# Patient Record
Sex: FEMALE | Race: BLACK OR AFRICAN AMERICAN | NOT HISPANIC OR LATINO | Employment: OTHER | URBAN - METROPOLITAN AREA
[De-identification: names, ages, dates, MRNs, and addresses within clinical notes are randomized per-mention and may not be internally consistent; named-entity substitution may affect disease eponyms.]

---

## 2023-05-13 DIAGNOSIS — E78.5 HYPERLIPIDEMIA, UNSPECIFIED HYPERLIPIDEMIA TYPE: ICD-10-CM

## 2023-05-15 PROBLEM — R09.89 ABDOMINAL BRUIT: Status: RESOLVED | Noted: 2023-05-15 | Resolved: 2023-05-15

## 2023-05-15 PROBLEM — E66.9 OBESITY: Status: ACTIVE | Noted: 2023-05-15

## 2023-05-15 PROBLEM — M47.816 LUMBAR SPONDYLOSIS: Status: ACTIVE | Noted: 2023-05-15

## 2023-05-15 PROBLEM — M25.561 ACUTE PAIN OF RIGHT KNEE: Status: RESOLVED | Noted: 2023-05-15 | Resolved: 2023-05-15

## 2023-05-15 PROBLEM — S39.012A LUMBAR STRAIN: Status: ACTIVE | Noted: 2023-05-15

## 2023-05-15 PROBLEM — R07.89 ATYPICAL CHEST PAIN: Status: ACTIVE | Noted: 2023-05-15

## 2023-05-15 PROBLEM — M54.16 LUMBAR RADICULOPATHY: Status: ACTIVE | Noted: 2023-05-15

## 2023-05-15 PROBLEM — M85.80 OSTEOPENIA: Status: ACTIVE | Noted: 2023-05-15

## 2023-05-15 PROBLEM — E11.9 DIABETES MELLITUS (MULTI): Status: ACTIVE | Noted: 2023-05-15

## 2023-05-15 PROBLEM — M79.18 BUTTOCK PAIN: Status: RESOLVED | Noted: 2023-05-15 | Resolved: 2023-05-15

## 2023-05-15 PROBLEM — R09.89 BILATERAL CAROTID BRUITS: Status: RESOLVED | Noted: 2023-05-15 | Resolved: 2023-05-15

## 2023-05-15 PROBLEM — I10 SYSTOLIC HYPERTENSION, ISOLATED: Status: ACTIVE | Noted: 2023-05-15

## 2023-05-15 PROBLEM — I10 BENIGN ESSENTIAL HYPERTENSION: Status: ACTIVE | Noted: 2023-05-15

## 2023-05-15 PROBLEM — G47.9 SLEEP DISTURBANCE: Status: ACTIVE | Noted: 2023-05-15

## 2023-05-15 PROBLEM — E55.9 VITAMIN D DEFICIENCY: Status: ACTIVE | Noted: 2023-05-15

## 2023-05-15 PROBLEM — R01.1 SYSTOLIC MURMUR: Status: ACTIVE | Noted: 2023-05-15

## 2023-05-15 PROBLEM — E78.5 HYPERLIPIDEMIA: Status: ACTIVE | Noted: 2023-05-15

## 2023-05-15 RX ORDER — ATORVASTATIN CALCIUM 20 MG/1
TABLET, FILM COATED ORAL
Qty: 90 TABLET | Refills: 0 | Status: SHIPPED | OUTPATIENT
Start: 2023-05-15 | End: 2023-09-13

## 2023-07-15 DIAGNOSIS — E11.9 TYPE 2 DIABETES MELLITUS WITHOUT COMPLICATION, UNSPECIFIED WHETHER LONG TERM INSULIN USE (MULTI): ICD-10-CM

## 2023-07-18 RX ORDER — PIOGLITAZONEHYDROCHLORIDE 45 MG/1
45 TABLET ORAL DAILY
COMMUNITY
End: 2023-09-19

## 2023-07-18 RX ORDER — INSULIN GLARGINE 300 U/ML
INJECTION, SOLUTION SUBCUTANEOUS
Qty: 13.5 ML | Refills: 0 | Status: SHIPPED | OUTPATIENT
Start: 2023-07-18 | End: 2024-02-05

## 2023-07-18 RX ORDER — INSULIN GLARGINE 300 U/ML
INJECTION, SOLUTION SUBCUTANEOUS
COMMUNITY
End: 2023-11-08 | Stop reason: DRUGHIGH

## 2023-07-18 RX ORDER — METFORMIN HYDROCHLORIDE 500 MG/1
1 TABLET ORAL
COMMUNITY
Start: 2015-01-20

## 2023-07-18 RX ORDER — BLOOD SUGAR DIAGNOSTIC
1 STRIP MISCELLANEOUS
COMMUNITY
Start: 2015-01-27 | End: 2023-12-21

## 2023-07-18 RX ORDER — BENAZEPRIL HYDROCHLORIDE AND HYDROCHLOROTHIAZIDE 20; 25 MG/1; MG/1
1 TABLET ORAL DAILY
COMMUNITY
Start: 2019-04-05 | End: 2024-04-12

## 2023-09-13 DIAGNOSIS — E78.5 HYPERLIPIDEMIA, UNSPECIFIED HYPERLIPIDEMIA TYPE: ICD-10-CM

## 2023-09-13 RX ORDER — ATORVASTATIN CALCIUM 20 MG/1
TABLET, FILM COATED ORAL
Qty: 90 TABLET | Refills: 3 | Status: SHIPPED | OUTPATIENT
Start: 2023-09-13

## 2023-09-18 DIAGNOSIS — E11.9 TYPE 2 DIABETES MELLITUS WITHOUT COMPLICATION, UNSPECIFIED WHETHER LONG TERM INSULIN USE (MULTI): ICD-10-CM

## 2023-09-19 RX ORDER — PIOGLITAZONEHYDROCHLORIDE 45 MG/1
45 TABLET ORAL DAILY
Qty: 90 TABLET | Refills: 2 | Status: SHIPPED | OUTPATIENT
Start: 2023-09-19

## 2023-10-19 ENCOUNTER — TELEPHONE (OUTPATIENT)
Dept: PRIMARY CARE | Facility: CLINIC | Age: 78
End: 2023-10-19
Payer: MEDICARE

## 2023-10-23 DIAGNOSIS — Z12.31 ENCOUNTER FOR SCREENING MAMMOGRAM FOR MALIGNANT NEOPLASM OF BREAST: Primary | ICD-10-CM

## 2023-11-02 ENCOUNTER — APPOINTMENT (OUTPATIENT)
Dept: PRIMARY CARE | Facility: CLINIC | Age: 78
End: 2023-11-02
Payer: MEDICARE

## 2023-11-08 ENCOUNTER — APPOINTMENT (OUTPATIENT)
Dept: PRIMARY CARE | Facility: CLINIC | Age: 78
End: 2023-11-08
Payer: MEDICARE

## 2023-11-08 ENCOUNTER — OFFICE VISIT (OUTPATIENT)
Dept: PRIMARY CARE | Facility: CLINIC | Age: 78
End: 2023-11-08
Payer: MEDICARE

## 2023-11-08 VITALS
HEART RATE: 74 BPM | BODY MASS INDEX: 36.7 KG/M2 | SYSTOLIC BLOOD PRESSURE: 144 MMHG | WEIGHT: 215 LBS | DIASTOLIC BLOOD PRESSURE: 76 MMHG | RESPIRATION RATE: 16 BRPM | HEIGHT: 64 IN

## 2023-11-08 DIAGNOSIS — E04.2 MULTINODULAR THYROID: ICD-10-CM

## 2023-11-08 DIAGNOSIS — Z00.00 ROUTINE GENERAL MEDICAL EXAMINATION AT A HEALTH CARE FACILITY: Primary | ICD-10-CM

## 2023-11-08 DIAGNOSIS — E78.5 HYPERLIPIDEMIA, UNSPECIFIED HYPERLIPIDEMIA TYPE: ICD-10-CM

## 2023-11-08 DIAGNOSIS — Z11.59 ENCOUNTER FOR HEPATITIS C SCREENING TEST FOR LOW RISK PATIENT: ICD-10-CM

## 2023-11-08 DIAGNOSIS — I10 BENIGN ESSENTIAL HYPERTENSION: ICD-10-CM

## 2023-11-08 DIAGNOSIS — E66.01 CLASS 2 SEVERE OBESITY WITH SERIOUS COMORBIDITY AND BODY MASS INDEX (BMI) OF 36.0 TO 36.9 IN ADULT, UNSPECIFIED OBESITY TYPE (MULTI): ICD-10-CM

## 2023-11-08 DIAGNOSIS — I10 SYSTOLIC HYPERTENSION, ISOLATED: ICD-10-CM

## 2023-11-08 DIAGNOSIS — E11.9 TYPE 2 DIABETES MELLITUS WITHOUT COMPLICATION, UNSPECIFIED WHETHER LONG TERM INSULIN USE (MULTI): ICD-10-CM

## 2023-11-08 DIAGNOSIS — Z79.4 TYPE 2 DIABETES MELLITUS WITHOUT COMPLICATION, WITH LONG-TERM CURRENT USE OF INSULIN (MULTI): ICD-10-CM

## 2023-11-08 DIAGNOSIS — M79.89 MASS OF SOFT TISSUE OF NECK: ICD-10-CM

## 2023-11-08 DIAGNOSIS — E11.9 TYPE 2 DIABETES MELLITUS WITHOUT COMPLICATION, WITH LONG-TERM CURRENT USE OF INSULIN (MULTI): ICD-10-CM

## 2023-11-08 DIAGNOSIS — I65.21 STENOSIS OF RIGHT CAROTID ARTERY: ICD-10-CM

## 2023-11-08 DIAGNOSIS — E55.9 VITAMIN D DEFICIENCY: ICD-10-CM

## 2023-11-08 PROCEDURE — 1160F RVW MEDS BY RX/DR IN RCRD: CPT | Performed by: INTERNAL MEDICINE

## 2023-11-08 PROCEDURE — 93000 ELECTROCARDIOGRAM COMPLETE: CPT | Performed by: INTERNAL MEDICINE

## 2023-11-08 PROCEDURE — G0439 PPPS, SUBSEQ VISIT: HCPCS | Performed by: INTERNAL MEDICINE

## 2023-11-08 PROCEDURE — 1159F MED LIST DOCD IN RCRD: CPT | Performed by: INTERNAL MEDICINE

## 2023-11-08 PROCEDURE — 99214 OFFICE O/P EST MOD 30 MIN: CPT | Performed by: INTERNAL MEDICINE

## 2023-11-08 PROCEDURE — 1170F FXNL STATUS ASSESSED: CPT | Performed by: INTERNAL MEDICINE

## 2023-11-08 PROCEDURE — 3078F DIAST BP <80 MM HG: CPT | Performed by: INTERNAL MEDICINE

## 2023-11-08 PROCEDURE — 3077F SYST BP >= 140 MM HG: CPT | Performed by: INTERNAL MEDICINE

## 2023-11-08 PROCEDURE — 1036F TOBACCO NON-USER: CPT | Performed by: INTERNAL MEDICINE

## 2023-11-08 PROCEDURE — 99397 PER PM REEVAL EST PAT 65+ YR: CPT | Performed by: INTERNAL MEDICINE

## 2023-11-08 ASSESSMENT — PATIENT HEALTH QUESTIONNAIRE - PHQ9
1. LITTLE INTEREST OR PLEASURE IN DOING THINGS: NOT AT ALL
SUM OF ALL RESPONSES TO PHQ9 QUESTIONS 1 AND 2: 0
2. FEELING DOWN, DEPRESSED OR HOPELESS: NOT AT ALL

## 2023-11-08 ASSESSMENT — ENCOUNTER SYMPTOMS: OCCASIONAL FEELINGS OF UNSTEADINESS: 0

## 2023-11-08 NOTE — PATIENT INSTRUCTIONS
Overall you are in good health today  You have no clinical evidence of active heart disease and cardiac risk factors are satisfactory pending review of lab tests  Coronary artery calcium CT score October 2019 low/average risk for coronary artery disease; normal echocardiogram 2022  Carotid arteries in the neck have plaque and possible narrowing of blood flow based on ultrasound; recommend further evaluation by CT angiogram  Age and gender appropriate cancer screening reviewed; colonoscopy 2015, next 2025  Mammogram January 2024  Diabetes controlled to be confirmed by lab evaluation  Recommend eye exam  Recommend dentist  Osteoporosis screening bone densitometry borderline hip May 2021  Immunizations up-to-date with consideration of new  pneumonia shot, shingles vaccine, and RSV vaccine; pertussis vaccine recommended for childcare of an infant under 1 years old

## 2023-11-10 ENCOUNTER — PATIENT MESSAGE (OUTPATIENT)
Dept: PRIMARY CARE | Facility: CLINIC | Age: 78
End: 2023-11-10

## 2023-11-11 ENCOUNTER — LAB (OUTPATIENT)
Dept: LAB | Facility: LAB | Age: 78
End: 2023-11-11
Payer: MEDICARE

## 2023-11-11 DIAGNOSIS — E11.9 TYPE 2 DIABETES MELLITUS WITHOUT COMPLICATION, WITH LONG-TERM CURRENT USE OF INSULIN (MULTI): ICD-10-CM

## 2023-11-11 DIAGNOSIS — E55.9 VITAMIN D DEFICIENCY: ICD-10-CM

## 2023-11-11 DIAGNOSIS — I10 SYSTOLIC HYPERTENSION, ISOLATED: ICD-10-CM

## 2023-11-11 DIAGNOSIS — Z11.59 ENCOUNTER FOR HEPATITIS C SCREENING TEST FOR LOW RISK PATIENT: ICD-10-CM

## 2023-11-11 DIAGNOSIS — E78.5 HYPERLIPIDEMIA, UNSPECIFIED HYPERLIPIDEMIA TYPE: ICD-10-CM

## 2023-11-11 DIAGNOSIS — Z79.4 TYPE 2 DIABETES MELLITUS WITHOUT COMPLICATION, WITH LONG-TERM CURRENT USE OF INSULIN (MULTI): ICD-10-CM

## 2023-11-11 LAB
25(OH)D3 SERPL-MCNC: 67 NG/ML (ref 30–100)
ALBUMIN SERPL BCP-MCNC: 4.1 G/DL (ref 3.4–5)
ALT SERPL W P-5'-P-CCNC: 13 U/L (ref 7–45)
ANION GAP SERPL CALC-SCNC: 14 MMOL/L (ref 10–20)
APPEARANCE UR: ABNORMAL
AST SERPL W P-5'-P-CCNC: 17 U/L (ref 9–39)
BASOPHILS # BLD AUTO: 0.02 X10*3/UL (ref 0–0.1)
BASOPHILS NFR BLD AUTO: 0.5 %
BILIRUB UR STRIP.AUTO-MCNC: NEGATIVE MG/DL
BUN SERPL-MCNC: 17 MG/DL (ref 6–23)
BURR CELLS BLD QL SMEAR: NORMAL
CALCIUM SERPL-MCNC: 9.3 MG/DL (ref 8.6–10.6)
CHLORIDE SERPL-SCNC: 100 MMOL/L (ref 98–107)
CHOLEST SERPL-MCNC: 183 MG/DL (ref 0–199)
CHOLESTEROL/HDL RATIO: 3.5
CO2 SERPL-SCNC: 32 MMOL/L (ref 21–32)
COLOR UR: YELLOW
CREAT SERPL-MCNC: 0.58 MG/DL (ref 0.5–1.05)
CREAT UR-MCNC: 163.4 MG/DL (ref 20–320)
EOSINOPHIL # BLD AUTO: 0.1 X10*3/UL (ref 0–0.4)
EOSINOPHIL NFR BLD AUTO: 2.6 %
ERYTHROCYTE [DISTWIDTH] IN BLOOD BY AUTOMATED COUNT: 15.9 % (ref 11.5–14.5)
EST. AVERAGE GLUCOSE BLD GHB EST-MCNC: 186 MG/DL
GFR SERPL CREATININE-BSD FRML MDRD: >90 ML/MIN/1.73M*2
GLUCOSE SERPL-MCNC: 194 MG/DL (ref 74–99)
GLUCOSE UR STRIP.AUTO-MCNC: NEGATIVE MG/DL
HBA1C MFR BLD: 8.1 %
HCT VFR BLD AUTO: 39.8 % (ref 36–46)
HCV AB SER QL: NONREACTIVE
HDLC SERPL-MCNC: 52.1 MG/DL
HGB BLD-MCNC: 12.6 G/DL (ref 12–16)
IMM GRANULOCYTES # BLD AUTO: 0.01 X10*3/UL (ref 0–0.5)
IMM GRANULOCYTES NFR BLD AUTO: 0.3 % (ref 0–0.9)
KETONES UR STRIP.AUTO-MCNC: NEGATIVE MG/DL
LDLC SERPL CALC-MCNC: 115 MG/DL
LEUKOCYTE ESTERASE UR QL STRIP.AUTO: NEGATIVE
LYMPHOCYTES # BLD AUTO: 1.24 X10*3/UL (ref 0.8–3)
LYMPHOCYTES NFR BLD AUTO: 32.5 %
MCH RBC QN AUTO: 28.6 PG (ref 26–34)
MCHC RBC AUTO-ENTMCNC: 31.7 G/DL (ref 32–36)
MCV RBC AUTO: 91 FL (ref 80–100)
MICROALBUMIN UR-MCNC: 20.5 MG/L
MICROALBUMIN/CREAT UR: 12.5 UG/MG CREAT
MONOCYTES # BLD AUTO: 0.3 X10*3/UL (ref 0.05–0.8)
MONOCYTES NFR BLD AUTO: 7.9 %
NEUTROPHILS # BLD AUTO: 2.15 X10*3/UL (ref 1.6–5.5)
NEUTROPHILS NFR BLD AUTO: 56.2 %
NITRITE UR QL STRIP.AUTO: NEGATIVE
NON HDL CHOLESTEROL: 131 MG/DL (ref 0–149)
NRBC BLD-RTO: 0 /100 WBCS (ref 0–0)
PH UR STRIP.AUTO: 5 [PH]
PHOSPHATE SERPL-MCNC: 3.9 MG/DL (ref 2.5–4.9)
PLATELET # BLD AUTO: 255 X10*3/UL (ref 150–450)
POTASSIUM SERPL-SCNC: 4 MMOL/L (ref 3.5–5.3)
PROT UR STRIP.AUTO-MCNC: NEGATIVE MG/DL
RBC # BLD AUTO: 4.4 X10*6/UL (ref 4–5.2)
RBC # UR STRIP.AUTO: NEGATIVE /UL
RBC MORPH BLD: NORMAL
SODIUM SERPL-SCNC: 142 MMOL/L (ref 136–145)
SP GR UR STRIP.AUTO: 1.02
TRIGL SERPL-MCNC: 78 MG/DL (ref 0–149)
TSH SERPL-ACNC: 1.88 MIU/L (ref 0.44–3.98)
UROBILINOGEN UR STRIP.AUTO-MCNC: <2 MG/DL
VIT B12 SERPL-MCNC: 376 PG/ML (ref 211–911)
VLDL: 16 MG/DL (ref 0–40)
WBC # BLD AUTO: 3.8 X10*3/UL (ref 4.4–11.3)

## 2023-11-11 PROCEDURE — 81003 URINALYSIS AUTO W/O SCOPE: CPT

## 2023-11-11 PROCEDURE — 82306 VITAMIN D 25 HYDROXY: CPT

## 2023-11-11 PROCEDURE — 85025 COMPLETE CBC W/AUTO DIFF WBC: CPT

## 2023-11-11 PROCEDURE — 83036 HEMOGLOBIN GLYCOSYLATED A1C: CPT

## 2023-11-11 PROCEDURE — 80069 RENAL FUNCTION PANEL: CPT

## 2023-11-11 PROCEDURE — 82607 VITAMIN B-12: CPT

## 2023-11-11 PROCEDURE — 82570 ASSAY OF URINE CREATININE: CPT

## 2023-11-11 PROCEDURE — 84450 TRANSFERASE (AST) (SGOT): CPT

## 2023-11-11 PROCEDURE — 86803 HEPATITIS C AB TEST: CPT

## 2023-11-11 PROCEDURE — 80061 LIPID PANEL: CPT

## 2023-11-11 PROCEDURE — 36415 COLL VENOUS BLD VENIPUNCTURE: CPT

## 2023-11-11 PROCEDURE — 84443 ASSAY THYROID STIM HORMONE: CPT

## 2023-11-11 PROCEDURE — 84460 ALANINE AMINO (ALT) (SGPT): CPT

## 2023-11-11 PROCEDURE — 82043 UR ALBUMIN QUANTITATIVE: CPT

## 2023-12-01 ENCOUNTER — APPOINTMENT (OUTPATIENT)
Dept: RADIOLOGY | Facility: CLINIC | Age: 78
End: 2023-12-01
Payer: MEDICARE

## 2023-12-10 ASSESSMENT — PATIENT HEALTH QUESTIONNAIRE - PHQ9
1. LITTLE INTEREST OR PLEASURE IN DOING THINGS: NOT AT ALL
2. FEELING DOWN, DEPRESSED OR HOPELESS: NOT AT ALL
SUM OF ALL RESPONSES TO PHQ9 QUESTIONS 1 AND 2: 0

## 2023-12-10 ASSESSMENT — ACTIVITIES OF DAILY LIVING (ADL)
MANAGING_FINANCES: INDEPENDENT
DRESSING: INDEPENDENT
BATHING: INDEPENDENT
DOING_HOUSEWORK: INDEPENDENT
GROCERY_SHOPPING: INDEPENDENT
TAKING_MEDICATION: INDEPENDENT

## 2023-12-10 NOTE — PROGRESS NOTES
Subjective   Reason for Visit: Aicha Ivan is an 78 y.o. female here for a Medicare Wellness visit.     Past Medical, Surgical, and Family History reviewed and updated in chart.    Reviewed all medications by prescribing practitioner or clinical pharmacist (such as prescriptions, OTCs, herbal therapies and supplements) and documented in the medical record.    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 2 months    Patient Care Team:  Dinh Watters MD as PCP - General  Dinh Watters MD as PCP - [employer] PCP     Review of Systems  Weight Current 220 Maximum Desired 201 Change  In the past 6-12 weeks, have you experienced any significant ...Negative except where noted   Fever, chills, Night sweats, fatigue, change in appetite, change in sleep -  6-7 hours sleep   Skin sore, rashes, itching, moles of concern - nodule left thigh sensitive to warm water, present x yrs   Swollen or tender glands in neck, armpits, or groin   Unusually severe or prolonged headaches   Blurred, double, or loss of vision; last formal eye exam, with whom, glasses/contacts +due Dr Monroe cataract OU due   Loss or change in hearing, ear pain, ringing, or wax problem, hearing aid-    Nasal congestion, polyps, runny nose, sneezing-   Sores in mouth, sore throat, change or trouble in chewing, speaking, swallowing, or voice   Dental or jaw problem, dentures, regular dental care with whom +due   Neck or back pain; swelling, redness, pain, or stiffness in joints; known arthritis   Chest pain/pressure or unusual shortness of breath with exertion, shortness of breath while lying down, swelling in legs, sensation of heart racing or skipping beats; unexplained nausea, or breaking into a cold sweat; feeling faint, lightheaded, or dizzy Unusual cough - tickle    Stomach or abdominal pain, nausea/vomiting, diarrhea/constipation, blood in or  dark black color to stool, other change in bowel habits   Burning on  "urination, trouble getting stream started, frequent or nighttime urination, blood in urine, loss of control of bladder   Pelvic problems, including vaginal discharge, sores, or bleeding, menstrual problems, or problems in sexual relations     Lumps or thickening in breast, sores or discharge from nipples-   Weakness, numbness/tingling, especially on one side of the body or the other;  tremor, shakiness, loss of coordination, or falls; change in memory   Excessive or change in thirst, urination, or appetite; unusual sensitivity to the heat or cold   Unusual feelings of being blue, sad, down or depressed; stress, restlessness, tension or anxiety-  Falls -no     Objective   Vitals:  /76   Pulse 74   Resp 16   Ht 1.626 m (5' 4\")   Wt 97.5 kg (215 lb)   BMI 36.90 kg/m²       Physical Exam  General: well-developed, well-nourished middle-aged ambulatory obese black female in no acute distress  Head: normocephalic/atraumatic; temporal arteries intact, nontender; Temporalmandibular joints nontender without click  Eyes: EOMI, PERRLA, sclera white, conjunctiva pink, fundi not examined  Ears: Canals cerumen - clear, TMs clear intact with normal landmarks, hearing intact to speech and finger rub  Nose: Nasal mucosa clear, pink, moist; no sinus tenderness  Oropharynx: Mucosa clear, pink, moist without lesions or exudate   Dentition intact good/ fair condition/repair missing some teeth   Tonsils atrophy/absent   Tongue midline, normal mucosa and protrusion   Normal motion of palate/uvula/pharynx  Neck: Supple, full range of motion; no lymphadenopathy; no JVD   Thyroid palpable, smooth to palpation/observation without nodules left prominent   Carotids normal pulse, upstroke with bruit  Back: Spine normal shape, curvature, flexibility, nontender   No CVA or SIJ tenderness  Chest: Normal symmetrical, full expansion with equal breath sounds without adventitious sounds; wall nontender  Breasts: benign without discrete mass, " dimpling; nipple discharge; nontender  Cor: PMI normal; regular rate and rhythm; normal S1, S2 with systolic ejection murmur upper sternal border  Abdomen: Soft, nontender, benign without mass, hepatosplenomegaly, fluid; epigastric bruit   Bowels sounds present, normal; infraumbilical surgical scar and several stab puncture laparotomy scars  Pelvic: Not examined  Rectal: Not examined  Extremities: Bones, Joints intact with FROM, T0 L0 S0   Pulses intact, symmetrical; no edema; venous system legs normal  Neurological: Mental status - alert, appropriate affect, normal orientation and conversational interaction; right handed dominant; mild positive straight leg raise   Cranial Nerves: II-XII intact   Motor - 5/5 strength throughout muscle groups; normal tone, bulk   Sensory - intact symmetrical soft touch, sharp, filament sense   DTRs - intact knee, ankle, brachial   Cerebellar - normal finger-nose; absent Rhomberg sign   Station/gait - intact, stable including toe, heel, and tandem   Integument: Skin - clear without rash or lesion   Lymph - without cervical, axillary, or inguinal lymphadenopathy      Results/Data  Carotid duplex scan 4/25/2022 right greater than left plaque, consider carotid MRA; solid left-sided mass, lymph node or thyroid, consider soft tissue neck CT  Echocardiogram 4/5/22 normal  Electrocardiogram normal sinus rhythm  Laboratory    March 2022 satisfactory hemoglobin A1c 8.2% improved vitamin D normal  December 2020 reviewed  September 2019 satisfactory with hemoglobin A1c 8.4% February 2018 satisfactory except vitamin D 28; microalbumin near-normal, improved  Mammogram May 2021 January 2020 1/16/2023  Coronary artery calcium CT score October 2019 equals 28  Bone densitometry May 2021 osteopenia hip  Echocardiogram December 2006 normal left ventricular ejection fraction 60%  Sestamibi nuclear cardiac stress test March 2000 normal  Lab September 2016 glucose 210, hemoglobin A1c 8.7 %; lipid panel  satisfactory; thyroid function tests normal; vitamin D equal 25  Microalbumin +47 (less than 16), urinalysis trace protein  Bone densitometry November 2016 borderline hip  Colonoscopy 2015  Ophthalmology exam 2015  Thyroid scan August 2009 multinodular goiter with normal uptake  Carotid ultrasound June 2009 slight plaque but no hemodynamically significant stenosis     Assessment/Plan     Overall you are in good health today  No clinical evidence of heart disease  Recommend carotid MRA given plaque on ultrasound and soft tissue density  Age and gender appropriate cancer screening and prevention reviewed  Immunizations reviewed and recommended  Diabetes clinically stable, reevaluate lab profile, ophthalmology exam    Problem List Items Addressed This Visit       Diabetes mellitus (CMS/[facility])    Relevant Orders    Hemoglobin A1C (Completed)    TSH with reflex to Free T4 if abnormal (Completed)    CBC and Auto Differential (Completed)    Urinalysis with Reflex Microscopic (Completed)    Albumin , Urine Random (Completed)    ECG 12 lead (Clinic Performed) (Completed)    Vitamin B12 (Completed)    Referral to Ophthalmology    Vitamin D deficiency    Relevant Orders    Vitamin D 25-Hydroxy,Total (for eval of Vitamin D levels) (Completed)    Systolic hypertension, isolated    Relevant Orders    Renal Function Panel (Completed)    Alanine Aminotransferase (Completed)    Aspartate Aminotransferase (Completed)    CBC and Auto Differential (Completed)    Urinalysis with Reflex Microscopic (Completed)    ECG 12 lead (Clinic Performed) (Completed)    Obesity    Hyperlipidemia    Relevant Orders    Lipid Panel (Completed)    TSH with reflex to Free T4 if abnormal (Completed)    Benign essential hypertension    Mass of soft tissue of neck    Multinodular thyroid    Stenosis of right carotid artery    Relevant Orders    CT angio neck w and wo IV contrast    Encounter for hepatitis C screening test for low risk patient    Relevant  Orders    Hepatitis C Antibody (Completed)    Routine general medical examination at a health care facility - Primary    Relevant Orders    Follow Up In [facility] - PCP - Medicare Annual

## 2023-12-20 DIAGNOSIS — E11.9 TYPE 2 DIABETES MELLITUS WITHOUT COMPLICATION, WITHOUT LONG-TERM CURRENT USE OF INSULIN (MULTI): Primary | ICD-10-CM

## 2023-12-21 RX ORDER — BLOOD SUGAR DIAGNOSTIC
STRIP MISCELLANEOUS
Qty: 100 STRIP | Refills: 3 | Status: SHIPPED | OUTPATIENT
Start: 2023-12-21 | End: 2024-03-20

## 2024-01-23 ENCOUNTER — HOSPITAL ENCOUNTER (OUTPATIENT)
Dept: RADIOLOGY | Facility: CLINIC | Age: 79
Discharge: HOME | End: 2024-01-23
Payer: MEDICARE

## 2024-01-23 DIAGNOSIS — Z12.31 ENCOUNTER FOR SCREENING MAMMOGRAM FOR MALIGNANT NEOPLASM OF BREAST: ICD-10-CM

## 2024-01-23 PROCEDURE — 77067 SCR MAMMO BI INCL CAD: CPT | Performed by: RADIOLOGY

## 2024-01-23 PROCEDURE — 77067 SCR MAMMO BI INCL CAD: CPT

## 2024-01-23 PROCEDURE — 77063 BREAST TOMOSYNTHESIS BI: CPT | Performed by: RADIOLOGY

## 2024-02-01 DIAGNOSIS — E11.9 TYPE 2 DIABETES MELLITUS WITHOUT COMPLICATION, UNSPECIFIED WHETHER LONG TERM INSULIN USE (MULTI): ICD-10-CM

## 2024-02-05 RX ORDER — INSULIN GLARGINE 300 [IU]/ML
38 INJECTION, SOLUTION SUBCUTANEOUS EVERY MORNING
Qty: 1 EACH | Refills: 3 | Status: SHIPPED | OUTPATIENT
Start: 2024-02-05 | End: 2024-02-06 | Stop reason: SDUPTHER

## 2024-02-06 DIAGNOSIS — E11.9 TYPE 2 DIABETES MELLITUS WITHOUT COMPLICATION, UNSPECIFIED WHETHER LONG TERM INSULIN USE (MULTI): ICD-10-CM

## 2024-02-06 RX ORDER — INSULIN GLARGINE 300 [IU]/ML
38 INJECTION, SOLUTION SUBCUTANEOUS EVERY MORNING
Qty: 3 ML | Refills: 3 | Status: SHIPPED | OUTPATIENT
Start: 2024-02-06 | End: 2024-04-12 | Stop reason: SDUPTHER

## 2024-04-08 DIAGNOSIS — E11.9 TYPE 2 DIABETES MELLITUS WITHOUT COMPLICATION, WITHOUT LONG-TERM CURRENT USE OF INSULIN (MULTI): Primary | ICD-10-CM

## 2024-04-09 ENCOUNTER — LAB (OUTPATIENT)
Dept: LAB | Facility: LAB | Age: 79
End: 2024-04-09
Payer: MEDICARE

## 2024-04-09 DIAGNOSIS — E11.9 TYPE 2 DIABETES MELLITUS WITHOUT COMPLICATION, WITHOUT LONG-TERM CURRENT USE OF INSULIN (MULTI): ICD-10-CM

## 2024-04-09 LAB
ANION GAP SERPL CALC-SCNC: 11 MMOL/L (ref 10–20)
BUN SERPL-MCNC: 22 MG/DL (ref 6–23)
CALCIUM SERPL-MCNC: 9.3 MG/DL (ref 8.6–10.6)
CHLORIDE SERPL-SCNC: 101 MMOL/L (ref 98–107)
CO2 SERPL-SCNC: 32 MMOL/L (ref 21–32)
CREAT SERPL-MCNC: 0.77 MG/DL (ref 0.5–1.05)
EGFRCR SERPLBLD CKD-EPI 2021: 79 ML/MIN/1.73M*2
GLUCOSE SERPL-MCNC: 235 MG/DL (ref 74–99)
POTASSIUM SERPL-SCNC: 4.3 MMOL/L (ref 3.5–5.3)
SODIUM SERPL-SCNC: 140 MMOL/L (ref 136–145)

## 2024-04-09 PROCEDURE — 36415 COLL VENOUS BLD VENIPUNCTURE: CPT

## 2024-04-09 PROCEDURE — 80048 BASIC METABOLIC PNL TOTAL CA: CPT

## 2024-04-10 ENCOUNTER — HOSPITAL ENCOUNTER (OUTPATIENT)
Dept: RADIOLOGY | Facility: CLINIC | Age: 79
Discharge: HOME | End: 2024-04-10
Payer: MEDICARE

## 2024-04-10 DIAGNOSIS — I65.21 STENOSIS OF RIGHT CAROTID ARTERY: ICD-10-CM

## 2024-04-10 PROCEDURE — 70498 CT ANGIOGRAPHY NECK: CPT | Performed by: RADIOLOGY

## 2024-04-10 PROCEDURE — 2550000001 HC RX 255 CONTRASTS: Performed by: INTERNAL MEDICINE

## 2024-04-10 PROCEDURE — 70498 CT ANGIOGRAPHY NECK: CPT

## 2024-04-10 RX ADMIN — IOHEXOL 75 ML: 350 INJECTION, SOLUTION INTRAVENOUS at 11:23

## 2024-04-12 ENCOUNTER — OFFICE VISIT (OUTPATIENT)
Dept: PRIMARY CARE | Facility: CLINIC | Age: 79
End: 2024-04-12
Payer: MEDICARE

## 2024-04-12 DIAGNOSIS — E11.9 TYPE 2 DIABETES MELLITUS WITHOUT COMPLICATION, UNSPECIFIED WHETHER LONG TERM INSULIN USE (MULTI): ICD-10-CM

## 2024-04-12 DIAGNOSIS — I10 SYSTOLIC HYPERTENSION, ISOLATED: Primary | ICD-10-CM

## 2024-04-12 DIAGNOSIS — I10 BENIGN ESSENTIAL HYPERTENSION: ICD-10-CM

## 2024-04-12 DIAGNOSIS — E04.2 MULTINODULAR THYROID: ICD-10-CM

## 2024-04-12 DIAGNOSIS — E11.59 TYPE 2 DIABETES MELLITUS WITH OTHER CIRCULATORY COMPLICATION, WITHOUT LONG-TERM CURRENT USE OF INSULIN (MULTI): ICD-10-CM

## 2024-04-12 DIAGNOSIS — I77.1 SUBCLAVIAN ARTERY STENOSIS, LEFT (CMS-HCC): ICD-10-CM

## 2024-04-12 DIAGNOSIS — I65.21 STENOSIS OF RIGHT CAROTID ARTERY: Primary | ICD-10-CM

## 2024-04-12 PROCEDURE — 3079F DIAST BP 80-89 MM HG: CPT | Performed by: INTERNAL MEDICINE

## 2024-04-12 PROCEDURE — 99214 OFFICE O/P EST MOD 30 MIN: CPT | Performed by: INTERNAL MEDICINE

## 2024-04-12 PROCEDURE — 3075F SYST BP GE 130 - 139MM HG: CPT | Performed by: INTERNAL MEDICINE

## 2024-04-12 PROCEDURE — 1036F TOBACCO NON-USER: CPT | Performed by: INTERNAL MEDICINE

## 2024-04-12 RX ORDER — INSULIN GLARGINE 300 [IU]/ML
38 INJECTION, SOLUTION SUBCUTANEOUS EVERY MORNING
Qty: 11.48 ML | Refills: 1 | Status: SHIPPED | OUTPATIENT
Start: 2024-04-12 | End: 2024-09-01

## 2024-04-12 RX ORDER — BENAZEPRIL HYDROCHLORIDE AND HYDROCHLOROTHIAZIDE 20; 25 MG/1; MG/1
1 TABLET ORAL DAILY
Qty: 90 TABLET | Refills: 3 | Status: SHIPPED | OUTPATIENT
Start: 2024-04-12

## 2024-04-12 NOTE — PATIENT INSTRUCTIONS
Right internal carotid artery stenosis-at risk for transient ischemic attack or stroke, sudden onset of impaired vision or facial droop on the right side, weakness numbness on left side of body-sudden onset of either requires 911 emergency room evaluation  Increase atorvastatin to 40 mg daily, continue aspirin 81 mg  Vascular surgery consult  Also 75% artery narrowing to the left arm

## 2024-04-21 VITALS — HEART RATE: 76 BPM | DIASTOLIC BLOOD PRESSURE: 80 MMHG | RESPIRATION RATE: 16 BRPM | SYSTOLIC BLOOD PRESSURE: 136 MMHG

## 2024-04-21 PROBLEM — I77.1 SUBCLAVIAN ARTERY STENOSIS, LEFT (CMS-HCC): Status: ACTIVE | Noted: 2024-04-21

## 2024-04-21 ASSESSMENT — ENCOUNTER SYMPTOMS
WEAKNESS: 0
SPEECH DIFFICULTY: 0
FEVER: 0
HEADACHES: 0
NEUROLOGICAL NEGATIVE: 1
POLYPHAGIA: 0
NUMBNESS: 0
CARDIOVASCULAR NEGATIVE: 1
POLYDIPSIA: 0
RESPIRATORY NEGATIVE: 1

## 2024-04-22 NOTE — PROGRESS NOTES
Subjective   Patient ID: Aicha Ivan is a 78 y.o. female who presents for Follow-up.  HPI  Review carotid artery testing-right stenosis  Asymptomatic for TIA/stroke    Review of Systems   Constitutional:  Negative for fever.   Eyes:  Negative for visual disturbance.   Respiratory: Negative.     Cardiovascular: Negative.    Endocrine: Negative for cold intolerance, heat intolerance, polydipsia, polyphagia and polyuria.        No symptoms of increased or decreased glucose  No symptoms of increased or decreased thyroid   Neurological: Negative.  Negative for speech difficulty, weakness, numbness and headaches.       Objective   Physical Exam  Constitutional:       General: She is not in acute distress.     Appearance: She is obese.   HENT:      Head:      Comments: No temporal artery tenderness  Eyes:      Extraocular Movements: Extraocular movements intact.   Neck:      Vascular: Carotid bruit present.      Comments: Nontender thyroid  Cardiovascular:      Rate and Rhythm: Normal rate and regular rhythm.      Pulses: Normal pulses.      Heart sounds: No murmur heard.  Pulmonary:      Effort: Pulmonary effort is normal.      Breath sounds: Normal breath sounds.   Musculoskeletal:      Cervical back: Normal range of motion. No tenderness.   Neurological:      General: No focal deficit present.      Mental Status: She is alert.      Cranial Nerves: No cranial nerve deficit.      Sensory: No sensory deficit.      Motor: No weakness.   Psychiatric:         Mood and Affect: Mood normal.       /80   Pulse 76   Resp 16      Data  CT angio neck 4/10/24  75% stenosis proximal right internal carotid artery, estimated 20% stenosis on the left, additional proximal left subclavian artery 75% stenosis, slightly enlarged multinodular thyroid gland  Carotid artery ultrasound 4/25/2022  CT cardiac calcium score 2019 equals 20  No thoracic or abdominal aneurysm  Lab 11/11/2023 TSH normal, hemoglobin A1c 8.1%  , HDL-C  52, ratio 3.5, LDL C115  Assessment/Plan     Right internal carotid artery stenosis-at risk for transient ischemic attack or stroke, sudden onset of impaired vision or facial droop on the right side, weakness numbness on left side of body-sudden onset of either requires 911 emergency room evaluation  Increase atorvastatin to 40 mg daily, continue aspirin 81 mg  Vascular surgery consult  Also 75% artery narrowing to the left arm  Reviewed diagnosis, risk factors, therapeutic options with illustrations, handouts and models for clarity  Problem List Items Addressed This Visit       Diabetes mellitus (Multi) - Primary    Relevant Medications    insulin glargine (Toujeo Max U-300 SoloStar) 300 unit/mL (3 mL) injection    Other Relevant Orders    Referral to Vascular Surgery    Benign essential hypertension    Multinodular thyroid    Stenosis of right carotid artery    Subclavian artery stenosis, left (CMS-[facility])

## 2024-06-05 ENCOUNTER — APPOINTMENT (OUTPATIENT)
Dept: VASCULAR SURGERY | Facility: HOSPITAL | Age: 79
End: 2024-06-05
Payer: MEDICARE

## 2024-06-12 ENCOUNTER — APPOINTMENT (OUTPATIENT)
Dept: VASCULAR SURGERY | Facility: HOSPITAL | Age: 79
End: 2024-06-12
Payer: MEDICARE

## 2024-06-17 PROBLEM — D50.0 IRON DEFICIENCY ANEMIA DUE TO CHRONIC BLOOD LOSS: Status: ACTIVE | Noted: 2024-06-17

## 2024-06-17 RX ORDER — NAPROXEN SODIUM 550 MG/1
TABLET ORAL
COMMUNITY
Start: 2018-01-05 | End: 2024-06-19 | Stop reason: WASHOUT

## 2024-06-17 RX ORDER — MOEXIPRIL HYDROCHLORIDE 15 MG/1
TABLET, FILM COATED ORAL
COMMUNITY
Start: 2019-04-05

## 2024-06-17 RX ORDER — BLOOD SUGAR DIAGNOSTIC
STRIP MISCELLANEOUS
COMMUNITY
Start: 2024-04-17

## 2024-06-19 ENCOUNTER — OFFICE VISIT (OUTPATIENT)
Dept: VASCULAR SURGERY | Facility: HOSPITAL | Age: 79
End: 2024-06-19
Payer: MEDICARE

## 2024-06-19 ENCOUNTER — APPOINTMENT (OUTPATIENT)
Dept: VASCULAR SURGERY | Facility: HOSPITAL | Age: 79
End: 2024-06-19
Payer: MEDICARE

## 2024-06-19 VITALS
HEART RATE: 82 BPM | SYSTOLIC BLOOD PRESSURE: 123 MMHG | DIASTOLIC BLOOD PRESSURE: 68 MMHG | WEIGHT: 203 LBS | BODY MASS INDEX: 34.84 KG/M2

## 2024-06-19 DIAGNOSIS — I65.23 BILATERAL CAROTID ARTERY STENOSIS: Primary | ICD-10-CM

## 2024-06-19 DIAGNOSIS — E11.9 TYPE 2 DIABETES MELLITUS WITHOUT COMPLICATION, UNSPECIFIED WHETHER LONG TERM INSULIN USE (MULTI): ICD-10-CM

## 2024-06-19 PROCEDURE — 3074F SYST BP LT 130 MM HG: CPT | Performed by: NURSE PRACTITIONER

## 2024-06-19 PROCEDURE — 1159F MED LIST DOCD IN RCRD: CPT | Performed by: NURSE PRACTITIONER

## 2024-06-19 PROCEDURE — 3078F DIAST BP <80 MM HG: CPT | Performed by: NURSE PRACTITIONER

## 2024-06-19 PROCEDURE — 99204 OFFICE O/P NEW MOD 45 MIN: CPT | Performed by: NURSE PRACTITIONER

## 2024-06-19 PROCEDURE — 1126F AMNT PAIN NOTED NONE PRSNT: CPT | Performed by: NURSE PRACTITIONER

## 2024-06-19 PROCEDURE — 99214 OFFICE O/P EST MOD 30 MIN: CPT | Performed by: NURSE PRACTITIONER

## 2024-06-19 ASSESSMENT — ENCOUNTER SYMPTOMS
OCCASIONAL FEELINGS OF UNSTEADINESS: 0
DEPRESSION: 0
LOSS OF SENSATION IN FEET: 0

## 2024-06-19 ASSESSMENT — PAIN SCALES - GENERAL: PAINLEVEL: 0-NO PAIN

## 2024-07-06 NOTE — PROGRESS NOTES
Vascular Surgery Clinic Note    CC: NPV carotid stenosis     History Of Present Illness:   Aicha Ivan is a 78 y.o. LHD female here for initial evaluation. She was found to have approximately 75% right ICA stenosis and 20% left ICA stenosis on recent CTA neck performed after her PCP hear a bruit. She denies amaurosis fugax, unilateral weakness/numbness or speech impairments. She does not smoke. She is on aspirin and a statin. She has insulin dependent diabetes for the past 10 years. Her blood pressure is well controlled. She has left subclavian artery stenosis and denies left arm fatigue or dizziness with use of the left arm.     She denies claudication symptoms and is able to walk a mile distance. Her father  of a ruptured AAA. She had an AAA screen in  that was negative.     She lives with her . She has no history of CKD, CAD, CVA, thyroid disease or malignancy. She denies chest pain or shortness of breath.     Medical History:  Patient Active Problem List   Diagnosis    Diabetes mellitus (Multi)    Vitamin D deficiency    Systolic murmur    Systolic hypertension, isolated    Sleep disturbance    Osteopenia    Obesity    Lumbar strain    Lumbar spondylosis    Lumbar radiculopathy    Hyperlipidemia    Benign essential hypertension    Atypical chest pain    Mass of soft tissue of neck    Multinodular thyroid    Stenosis of right carotid artery    Encounter for hepatitis C screening test for low risk patient    Routine general medical examination at a health care facility    Subclavian artery stenosis, left (CMS-HCC)    Iron deficiency anemia due to chronic blood loss        SH:    Social Determinants of Health     Tobacco Use: Medium Risk (2024)    Patient History     Smoking Tobacco Use: Former     Smokeless Tobacco Use: Never     Passive Exposure: Never   Alcohol Use: Not on file   Financial Resource Strain: Not on file   Food Insecurity: Not on file   Transportation Needs: Not on file    Physical Activity: Not on file   Stress: Not on file   Social Connections: Not on file   Intimate Partner Violence: Not on file   Depression: Not at risk (12/10/2023)    PHQ-2     PHQ-2 Score: 0   Housing Stability: Not on file   Utilities: Not on file   Digital Equity: Not on file   Health Literacy: Not on file        FH:  Family History   Problem Relation Name Age of Onset    Other (Pregnancy) Mother  28        Complications    COPD Father      Other (Abdominal aortic aneurysm) Father      COPD Sister      Heart failure Sister      Hypertension Sister      Panic attack Sister      Pneumonia Daughter      Stroke Son          Allergies:   No Known Allergies    ROS:  All systems were reviewed and noted to be negative, other than described above.     Objective:  Last Recorded Vitals  Blood pressure 123/68, pulse 82, weight 92.1 kg (203 lb).    Meds:   Current Outpatient Medications   Medication Instructions    atorvastatin (Lipitor) 20 mg tablet TAKE ONE TABLET BY MOUTH EVERY DAY    benazepriL-hydrochlorothiazide (Lotensin HCT) 20-25 mg tablet 1 tablet, oral, Daily    insulin glargine (TOUJEO MAX U-300 SOLOSTAR) 38 Units, subcutaneous, Every morning    metFORMIN (Glucophage) 500 mg tablet 1 tablet, oral    moexipril (Univasc) 15 mg tablet oral, Daily RT    OneTouch Ultra Test strip Test glucose levels once daily or more as needed  as directed.    pioglitazone (ACTOS) 45 mg, oral, Daily       Exam:  Constitutional: Well appearing, NAD   PSYCH: Appropriate mood and affect  Eyes: Sclera clear  Neck: Supple  CV: No tachycardia   RESP: Unlabored breathing   GI: Soft, nontender, non-distended.    SKIN: No lesions  NEURO: No focal deficits noted.   EXTREMITIES: Warm & well perfused.  PULSES: normal upper extremity pulse exam. Palpable right DP and left PT.     Right arm BP: 128/73  Left arm BP: 128/69    Imaging Reviewed:  CT angio neck 04/10/2024    Narrative  Interpreted By:  Leon Huertas,  STUDY:  CT ANGIO NECK  performed 4/10/2024    INDICATION:  Signs/Symptoms:carotid artery stenosis on ultrasound    COMPARISON:  Carotid ultrasound performed 04/25/2022.    ACCESSION NUMBER(S):  [id number]    ORDERING CLINICIAN:  MARCIA BLAIR    TECHNIQUE:  Axial CTA imaging was obtained through the neck following the  administration of 75 mL Omnipaque 350 intravenous contrast. Coronal,  sagittal, MIP, and 3D reconstructions were obtained. 3D  reconstructions were rendered using a separate 3D workstation.    Examination degraded by motion artifact, particularly near the  carotid bifurcations. Examination is also suboptimal related to  timing of the contrast bolus.    FINDINGS:  NECK:    Trace fluid within the inferior right mastoid. Periodontal disease  with periapical lucency involving the right mandibular 2nd molar  (tooth #31). No suspicious lymphadenopathy. No evidence of underlying  mass lesion within the neck soft tissues. Patent airway. Salivary  glands are unremarkable. Slightly enlarged multinodular thyroid  gland. No acute osseous abnormality of the cervical spine. Visualized  lung apices are clear. Aortic valve calcifications and coronary  artery atherosclerotic calcification.    VASCULAR FINDINGS:    Aorta and subclavian arteries:Normal three vessel aortic arch  configuration.Moderate atherosclerotic calcification of the aortic  arch with involvement of the great vessel origins. There is severe  atherosclerotic calcification involving the proximal left subclavian  artery with estimated 75% stenosis or greater (series 8, image 439).    Common carotid arteries: Patent bilaterally without flow significant  stenosis.    External carotid arteries: Patent bilaterally.    Internal carotid arteries: Patent bilaterally. Moderate to severe  atherosclerotic involvement of the right proximal cervical internal  carotid artery with estimated 75% stenosis by NASCET criteria (series  8, image 212). Mild atherosclerotic calcification of the  proximal  left internal carotid artery with estimated 20% stenosis by NASCET  criteria.    Vertebral arteries: The right vertebral artery origin is not well  delineated. There is atherosclerotic involvement of the left  vertebral artery origin with mild-to-moderate luminal narrowing  suggested. Remainder of the vertebral arteries appear patent without  flow significant focal stenosis.    Impression  1. Examination limited by motion artifact and suboptimal timing of  the contrast bolus.  2. There is estimated 75% NASCET stenosis of the proximal cervical  right internal carotid artery secondary to calcified plaque.  Estimated 20% stenosis on the left.  3. There is additional severe atherosclerotic calcification involving  the proximal left subclavian artery with estimated 75% stenosis as  well.  4. Slightly enlarged multinodular thyroid gland. Consider dedicated  thyroid ultrasound for further characterization.    MACRO:  None    Signed by: Leon Huertas 4/10/2024 12:28 PM  Dictation workstation:   [id number]        Assessment & Plan:  1. Bilateral carotid artery stenosis  Vascular US carotid artery duplex bilateral      2. Type 2 diabetes mellitus without complication, unspecified whether long term insulin use (Multi)  Referral to Vascular Surgery        Asymptomatic right carotid artery stenosis.   Asymptomatic left subclavian artery stenosis  Blood pressure control, to be taken on right arm only   Continue asa/statin   RTC in 6 months with carotid duplex     Jessica Mendez, APRN-CNP

## 2024-10-04 DIAGNOSIS — E78.5 HYPERLIPIDEMIA, UNSPECIFIED HYPERLIPIDEMIA TYPE: ICD-10-CM

## 2024-10-07 RX ORDER — ATORVASTATIN CALCIUM 20 MG/1
TABLET, FILM COATED ORAL
Qty: 90 TABLET | Refills: 3 | Status: SHIPPED | OUTPATIENT
Start: 2024-10-07

## 2024-12-11 DIAGNOSIS — E11.9 TYPE 2 DIABETES MELLITUS WITHOUT COMPLICATION, UNSPECIFIED WHETHER LONG TERM INSULIN USE (MULTI): ICD-10-CM

## 2024-12-12 RX ORDER — PIOGLITAZONEHYDROCHLORIDE 45 MG/1
45 TABLET ORAL DAILY
Qty: 90 TABLET | Refills: 1 | Status: SHIPPED | OUTPATIENT
Start: 2024-12-12

## 2025-01-08 ENCOUNTER — APPOINTMENT (OUTPATIENT)
Dept: VASCULAR SURGERY | Facility: HOSPITAL | Age: 80
End: 2025-01-08
Payer: MEDICARE

## 2025-01-14 DIAGNOSIS — E11.9 TYPE 2 DIABETES MELLITUS WITHOUT COMPLICATION, UNSPECIFIED WHETHER LONG TERM INSULIN USE (MULTI): ICD-10-CM

## 2025-01-15 RX ORDER — INSULIN GLARGINE 300 U/ML
INJECTION, SOLUTION SUBCUTANEOUS
Qty: 12 ML | Refills: 1 | Status: SHIPPED | OUTPATIENT
Start: 2025-01-15

## 2025-01-16 ENCOUNTER — TELEPHONE (OUTPATIENT)
Dept: PRIMARY CARE | Facility: CLINIC | Age: 80
End: 2025-01-16

## 2025-02-26 DIAGNOSIS — E78.5 HYPERLIPIDEMIA, UNSPECIFIED HYPERLIPIDEMIA TYPE: ICD-10-CM

## 2025-02-26 RX ORDER — ATORVASTATIN CALCIUM 40 MG/1
40 TABLET, FILM COATED ORAL DAILY
Qty: 90 TABLET | Refills: 3 | Status: SHIPPED | OUTPATIENT
Start: 2025-02-26

## 2025-04-21 DIAGNOSIS — E11.59 TYPE 2 DIABETES MELLITUS WITH OTHER CIRCULATORY COMPLICATION, WITHOUT LONG-TERM CURRENT USE OF INSULIN: ICD-10-CM

## 2025-04-21 DIAGNOSIS — E11.9 TYPE 2 DIABETES MELLITUS WITHOUT COMPLICATION, UNSPECIFIED WHETHER LONG TERM INSULIN USE: ICD-10-CM

## 2025-04-21 DIAGNOSIS — I10 SYSTOLIC HYPERTENSION, ISOLATED: ICD-10-CM

## 2025-04-21 RX ORDER — PIOGLITAZONE 45 MG/1
45 TABLET ORAL DAILY
Qty: 90 TABLET | Refills: 1 | Status: CANCELLED | OUTPATIENT
Start: 2025-04-21

## 2025-04-21 RX ORDER — BENAZEPRIL HYDROCHLORIDE AND HYDROCHLOROTHIAZIDE 20; 25 MG/1; MG/1
1 TABLET ORAL DAILY
Qty: 90 TABLET | Refills: 3 | Status: CANCELLED | OUTPATIENT
Start: 2025-04-21

## 2025-04-30 DIAGNOSIS — E11.9 TYPE 2 DIABETES MELLITUS WITHOUT COMPLICATION, UNSPECIFIED WHETHER LONG TERM INSULIN USE: ICD-10-CM

## 2025-04-30 DIAGNOSIS — I10 SYSTOLIC HYPERTENSION, ISOLATED: ICD-10-CM

## 2025-04-30 DIAGNOSIS — E11.59 TYPE 2 DIABETES MELLITUS WITH OTHER CIRCULATORY COMPLICATION, WITHOUT LONG-TERM CURRENT USE OF INSULIN: ICD-10-CM

## 2025-04-30 RX ORDER — BENAZEPRIL HYDROCHLORIDE AND HYDROCHLOROTHIAZIDE 20; 25 MG/1; MG/1
1 TABLET ORAL DAILY
Qty: 90 TABLET | Refills: 3 | Status: SHIPPED | OUTPATIENT
Start: 2025-04-30

## 2025-04-30 RX ORDER — PIOGLITAZONE 45 MG/1
45 TABLET ORAL DAILY
Qty: 90 TABLET | Refills: 3 | Status: SHIPPED | OUTPATIENT
Start: 2025-04-30 | End: 2026-04-30

## 2025-09-10 ENCOUNTER — APPOINTMENT (OUTPATIENT)
Dept: PRIMARY CARE | Facility: CLINIC | Age: 80
End: 2025-09-10
Payer: MEDICARE